# Patient Record
Sex: FEMALE | Race: WHITE | Employment: UNEMPLOYED
[De-identification: names, ages, dates, MRNs, and addresses within clinical notes are randomized per-mention and may not be internally consistent; named-entity substitution may affect disease eponyms.]

---
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
## 2019-04-10 ENCOUNTER — OFFICE VISIT (OUTPATIENT)
Dept: FAMILY MEDICINE CLINIC | Age: 19
End: 2019-04-10
Payer: COMMERCIAL

## 2019-04-10 VITALS
OXYGEN SATURATION: 98 % | HEIGHT: 63 IN | HEART RATE: 78 BPM | BODY MASS INDEX: 22.5 KG/M2 | SYSTOLIC BLOOD PRESSURE: 118 MMHG | WEIGHT: 127 LBS | DIASTOLIC BLOOD PRESSURE: 70 MMHG

## 2019-04-10 DIAGNOSIS — K52.9 AGE (ACUTE GASTROENTERITIS): Primary | ICD-10-CM

## 2019-04-10 PROCEDURE — 99213 OFFICE O/P EST LOW 20 MIN: CPT | Performed by: FAMILY MEDICINE

## 2019-04-10 ASSESSMENT — ENCOUNTER SYMPTOMS
SHORTNESS OF BREATH: 0
NAUSEA: 1
ABDOMINAL PAIN: 1
VOMITING: 0
COUGH: 0
SORE THROAT: 0
BACK PAIN: 0
SINUS PRESSURE: 0
DIARRHEA: 1

## 2019-04-10 NOTE — PROGRESS NOTES
Sara Melendez is a 22 y.o. female who presents todayfor her medical conditions/complaints as noted below. Sara Melendez is here today c/oNausea & Vomiting (onset tuesday ); Abdominal Pain (stomach feels tight); and Letter for School/Work  AGE sx this week and missed work last two days. Also she has had 70 lb weight loss since one year ago she states she is watching what she eats and drinks and has been trying to loose the weight.    :     Visit Information    Have you changed or started any medications since your last visit including any over-the-counter medicines, vitamins, or herbal medicines? no   Are you having any side effects from any of your medications? -  no  Have you stopped taking any of your medications? Is so, why? -  no    Have you seen any other physician or provider since your last visit? No  Have you had any other diagnostic tests since your last visit? No  Have you been seen in the emergency room and/or had an admission to a hospital since we last saw you? No  Have you had your routine dental cleaning in the past 6 months? no    Have you activated your [employer] account? If not, what are your barriers? Yes     Patient Care Team:  Yara Calderon MD as PCP - General (Family Medicine)    Medical History Review  Past Medical, Family, and Social History reviewed and does not contribute to the patient presenting condition    Health Maintenance   Topic Date Due    HIV screen  05/10/2014    HPV vaccine (2 - Female 3-dose series) 03/24/2016    Chlamydia screen  02/25/2017    Flu vaccine (Season Ended) 09/01/2019    DTaP/Tdap/Td vaccine (7 - Td) 02/25/2026    Varicella Vaccine  Addressed    Meningococcal (ACWY) Vaccine  Addressed    Pneumococcal 0-64 years Vaccine  Aged Out         HPI        Past Medical History:   Diagnosis Date    Depression       No past surgical history on file. No family history on file.   Social History     Tobacco Use    Smoking status: Never Smoker    Smokeless tobacco: Never Used   Substance Use Topics    Alcohol use: No      Current Outpatient Medications   Medication Sig Dispense Refill    hyoscyamine (LEVBID) 375 MCG extended release tablet Take 1 tablet by mouth every 12 hours as needed for Cramping 60 tablet 3    Cholecalciferol (VITAMIN D3) 39865 UNITS CAPS Take by mouth      norethindrone-ethinyl estradiol (JUNEL FE 1/20) 1-20 MG-MCG per tablet Take 1 tablet by mouth daily 1 packet 12     No current facility-administered medications for this visit. Allergies   Allergen Reactions    Pcn [Penicillins]          Subjective:   Review of Systems   Constitutional: Positive for unexpected weight change. Negative for chills, diaphoresis and fever. HENT: Negative for congestion, sinus pressure and sore throat. Eyes: Negative for visual disturbance. Respiratory: Negative for cough and shortness of breath. Cardiovascular: Negative for chest pain and leg swelling. Gastrointestinal: Positive for abdominal pain, diarrhea and nausea. Negative for vomiting. Genitourinary: Negative for dysuria, menstrual problem, urgency and vaginal discharge. Musculoskeletal: Negative for arthralgias, back pain and myalgias. Skin: Negative for rash. Neurological: Negative for weakness, numbness and headaches. Psychiatric/Behavioral: Negative for sleep disturbance.       :   /70   Pulse 78   Ht 5' 3\" (1.6 m)   Wt 127 lb (57.6 kg)   SpO2 98%   BMI 22.50 kg/m²     Physical Exam   Constitutional: She is oriented to person, place, and time. She appears well-developed and well-nourished. No distress. HENT:   Head: Normocephalic and atraumatic. Mouth/Throat: No oropharyngeal exudate. Eyes: No scleral icterus. Neck: Neck supple. Carotid bruit is not present. Cardiovascular: Exam reveals no gallop and no friction rub. No murmur heard. Pulmonary/Chest: No respiratory distress. She has no wheezes. She has no rales. She exhibits no tenderness.